# Patient Record
Sex: FEMALE | Race: BLACK OR AFRICAN AMERICAN | NOT HISPANIC OR LATINO | Employment: FULL TIME | URBAN - METROPOLITAN AREA
[De-identification: names, ages, dates, MRNs, and addresses within clinical notes are randomized per-mention and may not be internally consistent; named-entity substitution may affect disease eponyms.]

---

## 2017-01-17 ENCOUNTER — OFFICE VISIT (OUTPATIENT)
Dept: FAMILY MEDICINE | Facility: CLINIC | Age: 32
End: 2017-01-17
Payer: COMMERCIAL

## 2017-01-17 VITALS
WEIGHT: 153.2 LBS | SYSTOLIC BLOOD PRESSURE: 126 MMHG | OXYGEN SATURATION: 100 % | BODY MASS INDEX: 30.08 KG/M2 | RESPIRATION RATE: 16 BRPM | DIASTOLIC BLOOD PRESSURE: 72 MMHG | TEMPERATURE: 98.7 F | HEART RATE: 86 BPM | HEIGHT: 60 IN

## 2017-01-17 DIAGNOSIS — B37.31 YEAST INFECTION OF THE VAGINA: Primary | ICD-10-CM

## 2017-01-17 DIAGNOSIS — N76.0 BACTERIAL VAGINAL INFECTION: ICD-10-CM

## 2017-01-17 DIAGNOSIS — N76.0 VAGINITIS AND VULVOVAGINITIS: ICD-10-CM

## 2017-01-17 DIAGNOSIS — B96.89 BACTERIAL VAGINAL INFECTION: ICD-10-CM

## 2017-01-17 LAB
MICRO REPORT STATUS: ABNORMAL
SPECIMEN SOURCE: ABNORMAL
WET PREP SPEC: ABNORMAL

## 2017-01-17 PROCEDURE — 87210 SMEAR WET MOUNT SALINE/INK: CPT | Performed by: FAMILY MEDICINE

## 2017-01-17 PROCEDURE — 99213 OFFICE O/P EST LOW 20 MIN: CPT | Performed by: FAMILY MEDICINE

## 2017-01-17 RX ORDER — METRONIDAZOLE 500 MG/1
500 TABLET ORAL 2 TIMES DAILY
Qty: 28 TABLET | Refills: 0 | Status: SHIPPED | OUTPATIENT
Start: 2017-01-17 | End: 2018-04-04

## 2017-01-17 RX ORDER — METRONIDAZOLE 500 MG/1
500 TABLET ORAL 2 TIMES DAILY
Qty: 28 TABLET | Refills: 0 | Status: SHIPPED | OUTPATIENT
Start: 2017-01-17 | End: 2017-01-17

## 2017-01-17 NOTE — NURSING NOTE
Chief Complaint   Patient presents with     Vaginal Problem       Initial /72 mmHg  Pulse 86  Temp(Src) 98.7  F (37.1  C) (Tympanic)  Resp 16  Ht 5' (1.524 m)  Wt 153 lb 3.2 oz (69.491 kg)  BMI 29.92 kg/m2  SpO2 100% Estimated body mass index is 29.92 kg/(m^2) as calculated from the following:    Height as of this encounter: 5' (1.524 m).    Weight as of this encounter: 153 lb 3.2 oz (69.491 kg).  BP completed using cuff size: regular  Eneida Moran CMA

## 2017-01-17 NOTE — PROGRESS NOTES
Quick Note:    Please send normal lab letter when labs are complete  RESULTS TO PATIENT   METRONID 500MG TWICE DAILY   SIDE EFFECTS BENEFITS AND RISKS DISCUSSED   TREATMENT PROGNOSIS BENEFITS AND RISKS DISCUSSED   MEDICATION RISKS SIDE EFFECTS BENEFITS AND RISKS DISCUSSED     ______

## 2017-01-17 NOTE — Clinical Note
[facility]  [address]  [address]  [facility] [address]  [phone]                                                                                                           Abiola Silva  [address]   [facility] [address]    January 18, 2017      Dear Abiola,    The results of your recent tests were reviewed and are enclosed.     RESULTS TO PATIENT   METRONID 500MG TWICE DAILY   SIDE EFFECTS BENEFITS AND RISKS DISCUSSED    TREATMENT PROGNOSIS BENEFITS AND RISKS DISCUSSED   MEDICATION RISKS SIDE EFFECTS BENEFITS AND RISKS DISCUSSED   Results for orders placed or performed in visit on 01/17/17   Wet prep   Result Value Ref Range    Specimen Description Vagina     Wet Prep (A)      No Trichomonas seen  Clue cells seen  No yeast seen      Micro Report Status FINAL 01/17/2017            Thank you for choosing [facility].  We appreciate the opportunity to serve you and look forward to supporting your healthcare needs in the future.    If you have any questions or concerns, please call me or my staff at [phone].      Sincerely,    Juan Padron Jr MD

## 2017-01-17 NOTE — PATIENT INSTRUCTIONS
(B37.3) Yeast infection of the vagina  (primary encounter diagnosis)  Comment:    Plan: Wet prep, Chlamydia trachomatis PCR, Neisseria         gonorrhoeae PCR             (N76.0,  B96.89) Bacterial vaginal infection  Comment:    Plan: Chlamydia trachomatis PCR, Neisseria         gonorrhoeae PCR             (N76.0) Vaginitis and vulvovaginitis  Comment:    Plan: metroNIDAZOLE (FLAGYL) 500 MG tablet, Chlamydia        trachomatis PCR, Neisseria gonorrhoeae PCR,         DISCONTINUED: metroNIDAZOLE (FLAGYL) 500 MG         tablet

## 2017-01-17 NOTE — MR AVS SNAPSHOT
After Visit Summary   1/17/2017    Abiola Silva    MRN: [id number]           Patient Information     Date Of Birth          1985        Visit Information        Provider Department      1/17/2017 2:15 PM Juan Padron MD [facility]        Today's Diagnoses     Yeast infection of the vagina    -  1     Bacterial vaginal infection         Vaginitis and vulvovaginitis           Care Instructions    (B37.3) Yeast infection of the vagina  (primary encounter diagnosis)  Comment:    Plan: Wet prep, Chlamydia trachomatis PCR, Neisseria         gonorrhoeae PCR             (N76.0,  B96.89) Bacterial vaginal infection  Comment:    Plan: Chlamydia trachomatis PCR, Neisseria         gonorrhoeae PCR             (N76.0) Vaginitis and vulvovaginitis  Comment:    Plan: metroNIDAZOLE (FLAGYL) 500 MG tablet, Chlamydia        trachomatis PCR, Neisseria gonorrhoeae PCR,         DISCONTINUED: metroNIDAZOLE (FLAGYL) 500 MG         tablet                         Follow-ups after your visit        Future tests that were ordered for you today     Open Future Orders        Priority Expected Expires Ordered    Chlamydia trachomatis PCR Routine 1/18/2017 1/31/2017 1/17/2017    Neisseria gonorrhoeae PCR Routine 1/18/2017 1/31/2017 1/17/2017            Who to contact     If you have questions or need follow up information about today's clinic visit or your schedule please contact [facility] directly at [phone].  Normal or non-critical lab and imaging results will be communicated to you by MyChart, letter or phone within 4 business days after the clinic has received the results. If you do not hear from us within 7 days, please contact the clinic through MyChart or phone. If you have a critical or abnormal lab result, we will notify you by phone as soon as possible.  Submit refill requests through [employer] or call your pharmacy and they will  forward the refill request to us. Please allow 3 business days for your refill to be completed.          Additional Information About Your Visit        [employer] Information     [employer] gives you secure access to your electronic health record. If you see a primary care provider, you can also send messages to your care team and make appointments. If you have questions, please call your primary care clinic.  If you do not have a primary care provider, please call [phone] and they will assist you.        Care EveryWhere ID     This is your Care EveryWhere ID. This could be used by other organizations to access your [facility] medical records  [id number]        Your Vitals Were     Pulse Temperature Respirations Height BMI (Body Mass Index) Pulse Oximetry    86 98.7  F (37.1  C) (Tympanic) 16 5' (1.524 m) 29.92 kg/m2 100%       Blood Pressure from Last 3 Encounters:   01/17/17 126/72   10/27/16 136/87   10/04/16 123/78    Weight from Last 3 Encounters:   01/17/17 153 lb 3.2 oz (69.491 kg)   10/27/16 146 lb 3.2 oz (66.316 kg)   10/04/16 149 lb 14.4 oz (67.994 kg)              We Performed the Following     Wet prep          Today's Medication Changes          These changes are accurate as of: 1/17/17  3:35 PM.  If you have any questions, ask your nurse or doctor.               Start taking these medicines.        Dose/Directions    metroNIDAZOLE 500 MG tablet   Commonly known as:  FLAGYL   Used for:  Vaginitis and vulvovaginitis   Started by:  Juan Padron MD        Dose:  500 mg   Take 1 tablet (500 mg) by mouth 2 times daily   Quantity:  28 tablet   Refills:  0            Where to get your medicines      These medications were sent to [facility] [id number] [place] [address] AT [address] [place] [id number]     Phone:  [phone]    - metroNIDAZOLE 500 MG tablet             Primary Care Provider    Physician No Ref-Primary       No  address on file        Thank you!     Thank you for choosing [facility]  for your care. Our goal is always to provide you with excellent care. Hearing back from our patients is one way we can continue to improve our services. Please take a few minutes to complete the written survey that you may receive in the mail after your visit with us. Thank you!             Your Updated Medication List - Protect others around you: Learn how to safely use, store and throw away your medicines at www.disposemymeds.org.          This list is accurate as of: 1/17/17  3:35 PM.  Always use your most recent med list.                   Brand Name Dispense Instructions for use    metroNIDAZOLE 500 MG tablet    FLAGYL    28 tablet    Take 1 tablet (500 mg) by mouth 2 times daily

## 2018-04-04 ENCOUNTER — OFFICE VISIT (OUTPATIENT)
Dept: MIDWIFE SERVICES | Facility: CLINIC | Age: 33
End: 2018-04-04
Payer: COMMERCIAL

## 2018-04-04 VITALS
HEART RATE: 101 BPM | DIASTOLIC BLOOD PRESSURE: 80 MMHG | WEIGHT: 139 LBS | OXYGEN SATURATION: 99 % | BODY MASS INDEX: 27.29 KG/M2 | SYSTOLIC BLOOD PRESSURE: 118 MMHG | HEIGHT: 60 IN

## 2018-04-04 DIAGNOSIS — R30.0 DYSURIA: ICD-10-CM

## 2018-04-04 DIAGNOSIS — Z01.411 ENCOUNTER FOR GYNECOLOGICAL EXAMINATION WITH ABNORMAL FINDING: Primary | ICD-10-CM

## 2018-04-04 DIAGNOSIS — Z01.419 ENCOUNTER FOR GYNECOLOGICAL EXAMINATION WITHOUT ABNORMAL FINDING: ICD-10-CM

## 2018-04-04 DIAGNOSIS — Z32.00 PREGNANCY EXAMINATION OR TEST, PREGNANCY UNCONFIRMED: ICD-10-CM

## 2018-04-04 LAB
ALBUMIN UR-MCNC: NEGATIVE MG/DL
APPEARANCE UR: CLEAR
BETA HCG QUAL IFA URINE: NEGATIVE
BILIRUB UR QL STRIP: NEGATIVE
COLOR UR AUTO: YELLOW
ERYTHROCYTE [DISTWIDTH] IN BLOOD BY AUTOMATED COUNT: 18.2 % (ref 10–15)
GLUCOSE UR STRIP-MCNC: NEGATIVE MG/DL
HCT VFR BLD AUTO: 28.3 % (ref 35–47)
HGB BLD-MCNC: 8 G/DL (ref 11.7–15.7)
HGB UR QL STRIP: NEGATIVE
KETONES UR STRIP-MCNC: NEGATIVE MG/DL
LEUKOCYTE ESTERASE UR QL STRIP: NEGATIVE
MCH RBC QN AUTO: 20.6 PG (ref 26.5–33)
MCHC RBC AUTO-ENTMCNC: 28.3 G/DL (ref 31.5–36.5)
MCV RBC AUTO: 73 FL (ref 78–100)
NITRATE UR QL: NEGATIVE
PH UR STRIP: 8.5 PH (ref 5–7)
PLATELET # BLD AUTO: 564 10E9/L (ref 150–450)
RBC # BLD AUTO: 3.89 10E12/L (ref 3.8–5.2)
SOURCE: ABNORMAL
SP GR UR STRIP: 1.01 (ref 1–1.03)
UROBILINOGEN UR STRIP-ACNC: 0.2 EU/DL (ref 0.2–1)
WBC # BLD AUTO: 7.7 10E9/L (ref 4–11)

## 2018-04-04 PROCEDURE — 99213 OFFICE O/P EST LOW 20 MIN: CPT | Mod: 25 | Performed by: ADVANCED PRACTICE MIDWIFE

## 2018-04-04 PROCEDURE — 87591 N.GONORRHOEAE DNA AMP PROB: CPT | Performed by: ADVANCED PRACTICE MIDWIFE

## 2018-04-04 PROCEDURE — 99395 PREV VISIT EST AGE 18-39: CPT | Performed by: ADVANCED PRACTICE MIDWIFE

## 2018-04-04 PROCEDURE — 87340 HEPATITIS B SURFACE AG IA: CPT | Performed by: ADVANCED PRACTICE MIDWIFE

## 2018-04-04 PROCEDURE — 86780 TREPONEMA PALLIDUM: CPT | Performed by: ADVANCED PRACTICE MIDWIFE

## 2018-04-04 PROCEDURE — 86803 HEPATITIS C AB TEST: CPT | Performed by: ADVANCED PRACTICE MIDWIFE

## 2018-04-04 PROCEDURE — 87389 HIV-1 AG W/HIV-1&-2 AB AG IA: CPT | Performed by: ADVANCED PRACTICE MIDWIFE

## 2018-04-04 PROCEDURE — 81003 URINALYSIS AUTO W/O SCOPE: CPT | Performed by: ADVANCED PRACTICE MIDWIFE

## 2018-04-04 PROCEDURE — 36415 COLL VENOUS BLD VENIPUNCTURE: CPT | Performed by: ADVANCED PRACTICE MIDWIFE

## 2018-04-04 PROCEDURE — 85027 COMPLETE CBC AUTOMATED: CPT | Performed by: ADVANCED PRACTICE MIDWIFE

## 2018-04-04 PROCEDURE — 87491 CHLMYD TRACH DNA AMP PROBE: CPT | Performed by: ADVANCED PRACTICE MIDWIFE

## 2018-04-04 PROCEDURE — 84703 CHORIONIC GONADOTROPIN ASSAY: CPT | Performed by: ADVANCED PRACTICE MIDWIFE
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
## 2018-04-04 NOTE — MR AVS SNAPSHOT
After Visit Summary   4/4/2018    Abiola Silva    MRN: [id number]           Patient Information     Date Of Birth          1985        Visit Information        Provider Department      4/4/2018 2:00 PM Kirstin Morin APRN CNM [facility]        Today's Diagnoses     Encounter for gynecological examination with abnormal finding    -  1    Encounter for gynecological examination without abnormal finding        Pregnancy examination or test, pregnancy unconfirmed        Dysuria           Follow-ups after your visit        Your next 10 appointments already scheduled     Apr 10, 2018  7:00 AM CDT   US PELVIC COMPLETE W TRANSVAGINAL with RDUS1   [facility] ([facility])    [address] [place] [address] S  [address] [place] [id number] [phone]           Please bring a list of your medicines (including vitamins, minerals and over-the-counter drugs). Also, tell your doctor about any allergies you may have. Wear comfortable clothes and leave your valuables at home.  Adults: Drink six 8-ounce glasses of fluid one hour before your exam. Do NOT empty your bladder.  If you need to empty your bladder before your exam, try to release only a little bit of urine. Then, drink another 8oz glass of fluid.  Children: Children who are potty trained should drink at least 4 cups (32 oz) of liquid 45 minutes to one hour prior to the exam. The child s bladder must be full in order to achieve a diagnostic exam. If your child is very uncomfortable or has an urgent need to pee, please notify a technologist; they will try to find out how much longer the wait may be and provide instructions to help relieve the pressure. Occasionally it is medically necessary to insert a urinary catheter to fill the bladder.  Please call the Imaging Department at your exam site with any questions.              Future tests that were ordered for you today     Open Future Orders        Priority  Expected Expires Ordered    US Pelvic Complete w Transvaginal Routine  4/4/2019 4/4/2018            Who to contact     If you have questions or need follow up information about today's clinic visit or your schedule please contact [facility] directly at [phone].  Normal or non-critical lab and imaging results will be communicated to you by MyChart, letter or phone within 4 business days after the clinic has received the results. If you do not hear from us within 7 days, please contact the clinic through [employer] or phone. If you have a critical or abnormal lab result, we will notify you by phone as soon as possible.  Submit refill requests through [employer] or call your pharmacy and they will forward the refill request to us. Please allow 3 business days for your refill to be completed.          Additional Information About Your Visit        [employer] Information     [employer] gives you secure access to your electronic health record. If you see a primary care provider, you can also send messages to your care team and make appointments. If you have questions, please call your primary care clinic.  If you do not have a primary care provider, please call [phone] and they will assist you.        Care EveryWhere ID     This is your Care EveryWhere ID. This could be used by other organizations to access your [facility] medical records  [id number]        Your Vitals Were     Pulse Height Last Period Pulse Oximetry BMI (Body Mass Index)       101 5' (1.524 m) 03/22/2018 99% 27.15 kg/m2        Blood Pressure from Last 3 Encounters:   04/04/18 118/80   01/17/17 126/72   10/27/16 136/87    Weight from Last 3 Encounters:   04/04/18 139 lb (63 kg)   01/17/17 153 lb 3.2 oz (69.5 kg)   10/27/16 146 lb 3.2 oz (66.3 kg)              We Performed the Following     Anti Treponema     Beta HCG qual IFA urine     CBC with platelets     CHLAMYDIA TRACHOMATIS PCR     Hepatitis B surface antigen     Hepatitis C antibody      HIV Antigen Antibody Combo     NEISSERIA GONORRHOEA PCR     UA reflex to Microscopic and Culture        Primary Care Provider Fax #    Physician No Ref-Primary [phone]       No address on file        Equal Access to Services     CLEMENTE TROY : Ngoc Gutierrez, judehector villanuevarachha, mars kaascencion tonileslie, tata arlynin hayaamaricruz salazarmeliaclinton anderson. So [facility] [phone].    ATENCIÓN: Si habla español, tiene a zamudio disposición servicios gratuitos de asistencia lingüística. Llame al [phone].    We comply with applicable federal civil rights laws and [place] laws. We do not discriminate on the basis of race, color, national origin, age, disability, sex, sexual orientation, or gender identity.            Thank you!     Thank you for choosing [facility]  for your care. Our goal is always to provide you with excellent care. Hearing back from our patients is one way we can continue to improve our services. Please take a few minutes to complete the written survey that you may receive in the mail after your visit with us. Thank you!             Your Updated Medication List - Protect others around you: Learn how to safely use, store and throw away your medicines at www.disposemymeds.org.      Notice  As of 4/4/2018 11:59 PM    You have not been prescribed any medications.

## 2018-04-04 NOTE — NURSING NOTE
Chief Complaint   Patient presents with     Physical     phy and pap       Initial /80  Pulse 101  Ht 5' (1.524 m)  Wt 139 lb (63 kg)  LMP 2018  SpO2 99%  BMI 27.15 kg/m2 Estimated body mass index is 27.15 kg/(m^2) as calculated from the following:    Height as of this encounter: 5' (1.524 m).    Weight as of this encounter: 139 lb (63 kg).  BP completed using cuff size: regular        The following HM Due: NONE      The following patient reported/Care Every where data was sent to:  P ABSTRACT QUALITY INITIATIVES [[id number]]  na      patient has appointment for today

## 2018-04-05 LAB
C TRACH DNA SPEC QL NAA+PROBE: NEGATIVE
HBV SURFACE AG SERPL QL IA: NONREACTIVE
HCV AB SERPL QL IA: NONREACTIVE
HIV 1+2 AB+HIV1 P24 AG SERPL QL IA: NONREACTIVE
N GONORRHOEA DNA SPEC QL NAA+PROBE: NEGATIVE
SPECIMEN SOURCE: NORMAL
SPECIMEN SOURCE: NORMAL
T PALLIDUM IGG+IGM SER QL: NEGATIVE

## 2018-04-11 ENCOUNTER — TELEPHONE (OUTPATIENT)
Dept: MIDWIFE SERVICES | Facility: CLINIC | Age: 33
End: 2018-04-11

## 2018-04-11 DIAGNOSIS — D50.9 IRON DEFICIENCY ANEMIA, UNSPECIFIED IRON DEFICIENCY ANEMIA TYPE: Primary | ICD-10-CM

## 2018-04-11 DIAGNOSIS — D62 ANEMIA DUE TO BLOOD LOSS, ACUTE: ICD-10-CM

## 2018-04-11 RX ORDER — FERROUS SULFATE 325(65) MG
325 TABLET ORAL 2 TIMES DAILY
Qty: 60 TABLET | Refills: 2 | Status: SHIPPED | OUTPATIENT
Start: 2018-04-11

## 2018-04-11 NOTE — TELEPHONE ENCOUNTER
Can you please call and notify patient of normal lab results but low iron. I would like her to come in for some additional lab work and I put the orders in as future orders and also sent an rx for iron supplement to the [place] 2nd floor pharmacy. I will notify her once I receive the rest of the results. Thanks, Kirstin

## 2018-04-12 NOTE — TELEPHONE ENCOUNTER
Abiola called in today about her labs.    HPI:  Abiola had an annual exam with complaint of excessive menstruation.  Physical exam is significant for uterine fibroids with enlarged uterus to 14-16 wk size.     OBJECTIVE:  Nl STI screen results informed to patient.  CBC RESULTS:   Recent Labs   Lab Test  04/04/18   1520   WBC  7.7   RBC  3.89   HGB  8.0*   HCT  28.3*   MCV  73*   MCH  20.6*   MCHC  28.3*   RDW  18.2*   PLT  564*     Anemia suspected due to excessive menstruation but need to rule out other dx.      Future labs ordered and pt to schedule US to confirm location of fibroids and if mgt by Mirena is possible.      ASSESSMENT: Anemia, Moderate.   Fibroids,  Excessive menstration.    PLAN: Pt to have labs drawn as ordered future. Schedule an US for fibroid mgt with Mirena.  If Mirena fails to modify bleeding pattern consult with OB/GYN about alternative options including surgical.

## 2019-04-19 ENCOUNTER — HEALTH MAINTENANCE LETTER (OUTPATIENT)
Age: 34
End: 2019-04-19

## 2019-10-03 ENCOUNTER — HEALTH MAINTENANCE LETTER (OUTPATIENT)
Age: 34
End: 2019-10-03

## 2020-11-07 ENCOUNTER — HEALTH MAINTENANCE LETTER (OUTPATIENT)
Age: 35
End: 2020-11-07

## 2021-01-18 ENCOUNTER — OFFICE VISIT (OUTPATIENT)
Dept: URGENT CARE | Facility: URGENT CARE | Age: 36
End: 2021-01-18
Payer: COMMERCIAL

## 2021-01-18 VITALS
SYSTOLIC BLOOD PRESSURE: 126 MMHG | TEMPERATURE: 99.5 F | WEIGHT: 143.4 LBS | BODY MASS INDEX: 28.01 KG/M2 | OXYGEN SATURATION: 100 % | HEART RATE: 80 BPM | DIASTOLIC BLOOD PRESSURE: 84 MMHG

## 2021-01-18 DIAGNOSIS — B35.4 TINEA CORPORIS: Primary | ICD-10-CM

## 2021-01-18 DIAGNOSIS — L70.0 ACNE VULGARIS: ICD-10-CM

## 2021-01-18 PROCEDURE — 99203 OFFICE O/P NEW LOW 30 MIN: CPT | Performed by: FAMILY MEDICINE

## 2021-01-18 RX ORDER — DOXYCYCLINE HYCLATE 100 MG
100 TABLET ORAL 2 TIMES DAILY
Qty: 20 TABLET | Refills: 0 | Status: SHIPPED | OUTPATIENT
Start: 2021-01-18

## 2021-01-18 RX ORDER — MICONAZOLE NITRATE 20 MG/G
CREAM TOPICAL 2 TIMES DAILY
Qty: 15 G | Refills: 1 | Status: SHIPPED | OUTPATIENT
Start: 2021-01-18 | End: 2021-02-01

## 2021-01-18 ASSESSMENT — ENCOUNTER SYMPTOMS
FATIGUE: 0
BLOOD IN STOOL: 0
ADENOPATHY: 0
EYE PAIN: 0
DIARRHEA: 0
COUGH: 0
NUMBNESS: 0
VOMITING: 0
DYSURIA: 0
CHILLS: 0
SHORTNESS OF BREATH: 0
FEVER: 0
EYE ITCHING: 0
HEADACHES: 0
PSYCHIATRIC NEGATIVE: 1
DIZZINESS: 0
SORE THROAT: 0
ABDOMINAL PAIN: 0
NAUSEA: 0
SINUS PRESSURE: 0
EYE REDNESS: 0
CONSTIPATION: 0
PHOTOPHOBIA: 0
BRUISES/BLEEDS EASILY: 0
SINUS PAIN: 0
RHINORRHEA: 0
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
## 2021-05-05 ENCOUNTER — OFFICE VISIT (OUTPATIENT)
Dept: URGENT CARE | Facility: URGENT CARE | Age: 36
End: 2021-05-05
Payer: COMMERCIAL

## 2021-05-05 VITALS
BODY MASS INDEX: 25.97 KG/M2 | HEART RATE: 86 BPM | DIASTOLIC BLOOD PRESSURE: 89 MMHG | RESPIRATION RATE: 20 BRPM | TEMPERATURE: 97.7 F | WEIGHT: 133 LBS | OXYGEN SATURATION: 100 % | SYSTOLIC BLOOD PRESSURE: 138 MMHG

## 2021-05-05 DIAGNOSIS — Z86.018 HISTORY OF UTERINE FIBROID: ICD-10-CM

## 2021-05-05 DIAGNOSIS — N92.6 ABNORMAL MENSTRUAL CYCLE: ICD-10-CM

## 2021-05-05 DIAGNOSIS — I88.9 CERVICAL LYMPHADENITIS: Primary | ICD-10-CM

## 2021-05-05 DIAGNOSIS — M54.9 UPPER BACK PAIN ON LEFT SIDE: ICD-10-CM

## 2021-05-05 PROCEDURE — 99214 OFFICE O/P EST MOD 30 MIN: CPT | Performed by: PHYSICIAN ASSISTANT

## 2021-05-05 RX ORDER — IBUPROFEN 600 MG/1
600 TABLET, FILM COATED ORAL EVERY 6 HOURS PRN
Qty: 30 TABLET | Refills: 0 | Status: SHIPPED | OUTPATIENT
Start: 2021-05-05

## 2021-05-05 ASSESSMENT — ENCOUNTER SYMPTOMS
SINUS PAIN: 0
CONSTITUTIONAL NEGATIVE: 1
FEVER: 0
COUGH: 0
CHILLS: 0
COLOR CHANGE: 0
JOINT SWELLING: 0
GASTROINTESTINAL NEGATIVE: 1
RHINORRHEA: 0
WHEEZING: 0
SINUS PRESSURE: 0
BACK PAIN: 1
MYALGIAS: 1
ADENOPATHY: 1
CARDIOVASCULAR NEGATIVE: 1
FATIGUE: 0
ARTHRALGIAS: 0
SHORTNESS OF BREATH: 0
RESPIRATORY NEGATIVE: 1
SORE THROAT: 0
PALPITATIONS: 0
BRUISES/BLEEDS EASILY: 0

## 2021-05-05 NOTE — PROGRESS NOTES
Lisa Culver is a 36 year old who presents for the following health issues   HPI   Concern - swollen glands  Onset: 3days  Description: Noticed tender, swollen lymph node over the R side of her neck.    Intensity: moderate  Progression of Symptoms:  improving  Accompanying Signs & Symptoms:  No URI symptoms, sore throat or fevers.  No redness or drainage.    Previous history of similar problem: Yes, had same issue 4months ago and was given antibiotics with good resolution  Precipitating factors:        Worsened by: none  Alleviating factors:        Improved by: none  Therapies tried and outcome: tylenol, rest, fluids with some relief    2)  Also complains of irregular menstrual cycle this month with 2 periods  by 2weeks.  No prior hx of irregular menses.  Not currently bleeding.  Reports hx of fibroids with clots during her periods.  Feels that her fibroid has increased in size.    3)  Also complains of upper back pain, L side for the past few days.  Describes tingling sensation at times but no radicular pain, numbness or weakness.  No bladder or bowel dysfunction.  No trauma or injuries.  It is worse with deep breaths.  Has tried tylenol with minimal relief.    Patient Active Problem List   Diagnosis     Left breast lump     Ovarian cyst     Excessive or frequent menstruation     Current Outpatient Medications   Medication     doxycycline hyclate (VIBRA-TABS) 100 MG tablet     ferrous sulfate (IRON) 325 (65 Fe) MG tablet     No current facility-administered medications for this visit.         Allergies   Allergen Reactions     Cephalexin      Migraine     Latex      Prednisone      Made bones hurt       Review of Systems   Constitutional: Negative.  Negative for chills, fatigue and fever.   HENT: Negative for congestion, ear discharge, ear pain, hearing loss, rhinorrhea, sinus pressure, sinus pain and sore throat.    Respiratory: Negative.  Negative for cough, shortness of breath and wheezing.     Cardiovascular: Negative.  Negative for chest pain, palpitations and peripheral edema.   Gastrointestinal: Negative.    Genitourinary: Positive for menstrual problem. Negative for vaginal bleeding and vaginal discharge.   Musculoskeletal: Positive for back pain and myalgias. Negative for arthralgias, gait problem and joint swelling.   Skin: Negative for color change.   Hematological: Positive for adenopathy. Does not bruise/bleed easily.   All other systems reviewed and are negative.           Objective    /89 (BP Location: Left arm, Patient Position: Sitting, Cuff Size: Adult Regular)   Pulse 86   Temp 97.7  F (36.5  C) (Tympanic)   Resp 20   Wt 60.3 kg (133 lb)   SpO2 100%   BMI 25.97 kg/m    Body mass index is 25.97 kg/m .  Physical Exam  Vitals signs and nursing note reviewed.   Constitutional:       General: She is not in acute distress.     Appearance: Normal appearance. She is well-developed and normal weight. She is not ill-appearing.   HENT:      Head: Normocephalic and atraumatic.      Comments: TMs are intact without any erythema or bulging bilaterally.  Airway is patent.     Nose: Nose normal.      Mouth/Throat:      Lips: Pink.      Mouth: Mucous membranes are moist.      Pharynx: Oropharynx is clear. Uvula midline. No pharyngeal swelling, oropharyngeal exudate or posterior oropharyngeal erythema.      Tonsils: No tonsillar exudate.   Eyes:      General: No scleral icterus.     Extraocular Movements: Extraocular movements intact.      Conjunctiva/sclera: Conjunctivae normal.      Pupils: Pupils are equal, round, and reactive to light.   Neck:      Musculoskeletal: Normal range of motion and neck supple.      Thyroid: No thyromegaly.   Cardiovascular:      Rate and Rhythm: Normal rate and regular rhythm.      Pulses: Normal pulses.      Heart sounds: Normal heart sounds, S1 normal and S2 normal. No murmur. No friction rub. No gallop.    Pulmonary:      Effort: Pulmonary effort is normal. No  accessory muscle usage, respiratory distress or retractions.      Breath sounds: Normal breath sounds and air entry. No stridor. No decreased breath sounds, wheezing, rhonchi or rales.   Abdominal:      General: Abdomen is flat. Bowel sounds are normal.      Palpations: Abdomen is soft. There is mass. There is no hepatomegaly or splenomegaly.      Tenderness: There is no abdominal tenderness. There is no right CVA tenderness, left CVA tenderness, guarding or rebound. Negative signs include Mauricio's sign, Rovsing's sign and McBurney's sign.      Hernia: No hernia is present.   Musculoskeletal:      Thoracic back: She exhibits tenderness and spasm (L paraspinous muscle). She exhibits normal range of motion, no bony tenderness, no swelling, no edema, no deformity, no laceration and normal pulse.   Lymphadenopathy:      Head:      Right side of head: No submental, submandibular or tonsillar adenopathy.      Left side of head: No submental, submandibular or tonsillar adenopathy.      Cervical: Cervical adenopathy present.      Right cervical: Superficial cervical adenopathy present.      Left cervical: No superficial cervical adenopathy.   Skin:     General: Skin is warm and dry.      Capillary Refill: Capillary refill takes less than 2 seconds.      Nails: There is no clubbing.     Neurological:      Mental Status: She is alert and oriented to person, place, and time.   Psychiatric:         Mood and Affect: Mood normal.         Behavior: Behavior normal.         Thought Content: Thought content normal.         Judgment: Judgment normal.          Assessment/Plan:  Cervical lymphadenitis:  No other associated symptoms.  Will treat with [id number] for lymphadenitis and take with food/probiotics to minimize GI upset.  Recommend tylenol/ibuprofen prn pain/fever.  Recheck in clinic if symptoms worsen or if symptoms do not improve.  Will also send to ENT for further evaluation and management.   -      amoxicillin-clavulanate (AUGMENTIN) 875-125 MG tablet; Take 1 tablet by mouth 2 times daily for 10 days  -     OTOLARYNGOLOGY REFERRAL    Abnormal menstrual cycle:  Bleeding has since stopped.  Hx of fibroids as well.  Will send to OBGYN.  -     OB/GYN REFERRAL    History of uterine fibroid  -     OB/GYN REFERRAL    Upper back pain on left side:  Most likely strain/sprain/spasm.  Recommend RICE and will give ibuprofen prn pain. Recheck in clinic if symptoms worsen or if symptoms do not improve.   -     ibuprofen (ADVIL/MOTRIN) 600 MG tablet; Take 1 tablet (600 mg) by mouth every 6 hours as needed for moderate pain        Kaitlin See AIDEE Lopez

## 2021-06-15 ENCOUNTER — OFFICE VISIT (OUTPATIENT)
Dept: URGENT CARE | Facility: URGENT CARE | Age: 36
End: 2021-06-15
Payer: COMMERCIAL

## 2021-06-15 VITALS
SYSTOLIC BLOOD PRESSURE: 147 MMHG | TEMPERATURE: 98.9 F | WEIGHT: 134 LBS | HEART RATE: 74 BPM | OXYGEN SATURATION: 100 % | BODY MASS INDEX: 26.17 KG/M2 | DIASTOLIC BLOOD PRESSURE: 81 MMHG

## 2021-06-15 DIAGNOSIS — N89.8 VAGINAL DISCHARGE: Primary | ICD-10-CM

## 2021-06-15 DIAGNOSIS — B37.31 YEAST INFECTION OF THE VAGINA: ICD-10-CM

## 2021-06-15 DIAGNOSIS — N76.0 BACTERIAL VAGINITIS: ICD-10-CM

## 2021-06-15 DIAGNOSIS — B96.89 BACTERIAL VAGINITIS: ICD-10-CM

## 2021-06-15 LAB
ALBUMIN UR-MCNC: NEGATIVE MG/DL
APPEARANCE UR: CLEAR
BACTERIA #/AREA URNS HPF: ABNORMAL /HPF
BILIRUB UR QL STRIP: NEGATIVE
COLOR UR AUTO: YELLOW
GLUCOSE UR STRIP-MCNC: NEGATIVE MG/DL
HGB UR QL STRIP: NEGATIVE
KETONES UR STRIP-MCNC: NEGATIVE MG/DL
LEUKOCYTE ESTERASE UR QL STRIP: ABNORMAL
NITRATE UR QL: NEGATIVE
NON-SQ EPI CELLS #/AREA URNS LPF: ABNORMAL /LPF
PH UR STRIP: 6 PH (ref 5–7)
RBC #/AREA URNS AUTO: ABNORMAL /HPF
SOURCE: ABNORMAL
SP GR UR STRIP: 1.02 (ref 1–1.03)
SPECIMEN SOURCE: ABNORMAL
UROBILINOGEN UR STRIP-ACNC: 0.2 EU/DL (ref 0.2–1)
WBC #/AREA URNS AUTO: ABNORMAL /HPF
WET PREP SPEC: ABNORMAL

## 2021-06-15 PROCEDURE — 81001 URINALYSIS AUTO W/SCOPE: CPT | Performed by: NURSE PRACTITIONER

## 2021-06-15 PROCEDURE — 99214 OFFICE O/P EST MOD 30 MIN: CPT | Performed by: NURSE PRACTITIONER

## 2021-06-15 PROCEDURE — 87210 SMEAR WET MOUNT SALINE/INK: CPT | Performed by: NURSE PRACTITIONER

## 2021-06-15 RX ORDER — METRONIDAZOLE 500 MG/1
500 TABLET ORAL 2 TIMES DAILY
Qty: 14 TABLET | Refills: 0 | Status: SHIPPED | OUTPATIENT
Start: 2021-06-15 | End: 2021-06-22

## 2021-06-15 RX ORDER — FLUCONAZOLE 150 MG/1
150 TABLET ORAL ONCE
Qty: 2 TABLET | Refills: 0 | Status: SHIPPED | OUTPATIENT
Start: 2021-06-15 | End: 2021-06-15

## 2021-06-15 ASSESSMENT — ENCOUNTER SYMPTOMS: DYSURIA: 1
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
## 2021-09-05 ENCOUNTER — HEALTH MAINTENANCE LETTER (OUTPATIENT)
Age: 36
End: 2021-09-05

## 2021-12-26 ENCOUNTER — HEALTH MAINTENANCE LETTER (OUTPATIENT)
Age: 36
End: 2021-12-26

## 2022-09-01 ENCOUNTER — OFFICE VISIT (OUTPATIENT)
Dept: URGENT CARE | Facility: URGENT CARE | Age: 37
End: 2022-09-01
Payer: COMMERCIAL

## 2022-09-01 VITALS
DIASTOLIC BLOOD PRESSURE: 76 MMHG | OXYGEN SATURATION: 99 % | SYSTOLIC BLOOD PRESSURE: 116 MMHG | WEIGHT: 132.2 LBS | BODY MASS INDEX: 25.82 KG/M2 | TEMPERATURE: 97.7 F | HEART RATE: 86 BPM

## 2022-09-01 DIAGNOSIS — S09.90XA INJURY OF HEAD, INITIAL ENCOUNTER: Primary | ICD-10-CM

## 2022-09-01 PROCEDURE — 99214 OFFICE O/P EST MOD 30 MIN: CPT | Performed by: PHYSICIAN ASSISTANT

## 2022-09-01 ASSESSMENT — ENCOUNTER SYMPTOMS
HEADACHES: 1
COUGH: 0
WEAKNESS: 0
ENDOCRINE NEGATIVE: 1
DIZZINESS: 1
EYES NEGATIVE: 1
BACK PAIN: 0
NAUSEA: 0
SHORTNESS OF BREATH: 0
ALLERGIC/IMMUNOLOGIC NEGATIVE: 1
CARDIOVASCULAR NEGATIVE: 1
NECK PAIN: 0
MYALGIAS: 0
DIARRHEA: 0
VOMITING: 0
PALPITATIONS: 0
MUSCULOSKELETAL NEGATIVE: 1
ARTHRALGIAS: 0
NECK STIFFNESS: 0
RESPIRATORY NEGATIVE: 1
JOINT SWELLING: 0
RHINORRHEA: 0
LIGHT-HEADEDNESS: 0
FEVER: 0
CHILLS: 0
SORE THROAT: 0
WOUND: 0

## 2022-09-02 NOTE — PROGRESS NOTES
Chief Complaint:    Chief Complaint   Patient presents with     Urgent Care     Head Injury     Fell and hit head the back of head and left side of face about 4 or 5 o'clock today      Medical Decision Making:    Vital signs reviewed by Forrest Huynh PA-C  /76 (BP Location: Left arm, Patient Position: Sitting, Cuff Size: Adult Regular)   Pulse 86   Temp 97.7  F (36.5  C) (Tympanic)   Wt 60 kg (132 lb 3.2 oz)   SpO2 99%   BMI 25.82 kg/m      Differential Diagnosis:  Head InjuryMild head injury, Concussion, Skull fracture, Intracranial hemorrhage    Anemia      ASSESSMENT:     1. Injury of head, initial encounter       PLAN:     Patient is very upset in clinic and is crying.  Neuro exam was benign.  With syncopal event, and worsening headache, patient instructed to go to the ED now for further evaluation, labs, and imaging.  Patient declined EMS transport.  Patient instructed to follow up with PCP in the next week..  Patient verbalized understanding and agreed with this plan.    Labs:     No results found for any visits on 09/01/22.    Current Meds:    Current Outpatient Medications:      ferrous sulfate (IRON) 325 (65 Fe) MG tablet, Take 1 tablet (325 mg) by mouth 2 times daily, Disp: 60 tablet, Rfl: 2     Naproxen Sodium (ALEVE PO), , Disp: , Rfl:      doxycycline hyclate (VIBRA-TABS) 100 MG tablet, Take 1 tablet (100 mg) by mouth 2 times daily (Patient not taking: No sig reported), Disp: 20 tablet, Rfl: 0     HAYDEE PO, , Disp: , Rfl:      ibuprofen (ADVIL/MOTRIN) 600 MG tablet, Take 1 tablet (600 mg) by mouth every 6 hours as needed for moderate pain (Patient not taking: Reported on 9/1/2022), Disp: 30 tablet, Rfl: 0     NONFORMULARY, Black strap molasses (Patient not taking: Reported on 9/1/2022), Disp: , Rfl:     Allergies:  Allergies   Allergen Reactions     Cephalexin      Migraine     Latex      Prednisone      Made bones hurt       SUBJECTIVE    HPI: Abiola Silva is an 37 year old female  who presents for evaluation and treatment of head injury.  Patient was having an asthma attack and had a syncopal event when she fell and hit the back of her head on something in her closet 2 hours ago.  She does not know how long she was unconscious for.  She is currently undergoing iron transfusions for anemia.  She is complaining of headache, and pressure in her head that is worsening, some dizziness, and problems hearing out of her L ear.      No Hx of head injury or concussion.    ROS:      Review of Systems   Constitutional: Negative for chills and fever.   HENT: Positive for hearing loss. Negative for congestion, ear pain, rhinorrhea and sore throat.    Eyes: Negative.    Respiratory: Negative.  Negative for cough and shortness of breath.    Cardiovascular: Negative.  Negative for chest pain and palpitations.   Gastrointestinal: Negative for diarrhea, nausea and vomiting.   Endocrine: Negative.    Genitourinary: Negative.    Musculoskeletal: Negative.  Negative for arthralgias, back pain, joint swelling, myalgias, neck pain and neck stiffness.   Skin: Negative.  Negative for rash and wound.   Allergic/Immunologic: Negative.  Negative for immunocompromised state.   Neurological: Positive for dizziness and headaches. Negative for weakness and light-headedness.        Family History   Family History   Problem Relation Age of Onset     Breast Cancer Mother      Cancer Father        Social History  Social History     Socioeconomic History     Marital status: Single     Spouse name: Not on file     Number of children: Not on file     Years of education: Not on file     Highest education level: Not on file   Occupational History     Occupation: student   Tobacco Use     Smoking status: Never Smoker     Smokeless tobacco: Never Used   Substance and Sexual Activity     Alcohol use: Yes     Comment: socially     Drug use: No     Sexual activity: Yes     Partners: Male     Birth control/protection: None, Pull-out method    Other Topics Concern     Parent/sibling w/ CABG, MI or angioplasty before 65F 55M? Not Asked   Social History Narrative    How much exercise per week? 3 times weekly    How much calcium per day? Very Little      How much caffeine per day? Soda  2 daily    How much vitamin D per day? Very Little    Do you/your family wear seatbelts?  Yes    Do you/your family use safety helmets? Yes    Do you/your family use sunscreen? No    Do you/your family keep firearms in the home? No    Do you/your family have a smoke detector(s)? Yes        Do you feel safe in your home? Yes    Has anyone ever touched you in an unwanted manner? No                  Social Determinants of Health     Financial Resource Strain: Not on file   Food Insecurity: Not on file   Transportation Needs: Not on file   Physical Activity: Not on file   Stress: Not on file   Social Connections: Not on file   Intimate Partner Violence: Not on file   Housing Stability: Not on file        Surgical History:  Past Surgical History:   Procedure Laterality Date      SECTION          Problem List:  Patient Active Problem List   Diagnosis     Left breast lump     Ovarian cyst     Excessive or frequent menstruation           OBJECTIVE:     Vital signs noted and reviewed by Forrest Huynh PA-C  /76 (BP Location: Left arm, Patient Position: Sitting, Cuff Size: Adult Regular)   Pulse 86   Temp 97.7  F (36.5  C) (Tympanic)   Wt 60 kg (132 lb 3.2 oz)   SpO2 99%   BMI 25.82 kg/m       PEFR:    Physical Exam  Vitals and nursing note reviewed.   Constitutional:       General: She is not in acute distress.     Appearance: She is well-developed. She is not ill-appearing, toxic-appearing or diaphoretic.   HENT:      Head: Normocephalic and atraumatic.      Right Ear: Tympanic membrane and external ear normal. No drainage, swelling or tenderness. Tympanic membrane is not perforated, erythematous, retracted or bulging.      Left Ear: Tympanic membrane and  external ear normal. No drainage, swelling or tenderness. Tympanic membrane is not perforated, erythematous, retracted or bulging.      Nose: No mucosal edema, congestion or rhinorrhea.      Right Sinus: No maxillary sinus tenderness or frontal sinus tenderness.      Left Sinus: No maxillary sinus tenderness or frontal sinus tenderness.      Mouth/Throat:      Pharynx: No pharyngeal swelling, oropharyngeal exudate, posterior oropharyngeal erythema or uvula swelling.      Tonsils: No tonsillar abscesses.   Eyes:      General: No visual field deficit.     Pupils: Pupils are equal, round, and reactive to light.   Neck:      Trachea: Trachea normal.   Cardiovascular:      Rate and Rhythm: Normal rate and regular rhythm.      Heart sounds: Normal heart sounds, S1 normal and S2 normal. No murmur heard.    No friction rub. No gallop.   Pulmonary:      Effort: Pulmonary effort is normal. No respiratory distress.      Breath sounds: Normal breath sounds. No decreased breath sounds, wheezing, rhonchi or rales.   Abdominal:      General: Bowel sounds are normal. There is no distension.      Palpations: Abdomen is soft. Abdomen is not rigid. There is no mass.      Tenderness: There is no abdominal tenderness. There is no guarding or rebound.   Musculoskeletal:      Cervical back: Full passive range of motion without pain, normal range of motion and neck supple.   Lymphadenopathy:      Cervical: No cervical adenopathy.   Skin:     General: Skin is warm and dry.   Neurological:      Mental Status: She is alert and oriented to person, place, and time.      GCS: GCS eye subscore is 4. GCS verbal subscore is 5. GCS motor subscore is 6.      Cranial Nerves: No cranial nerve deficit or facial asymmetry.      Sensory: Sensation is intact. No sensory deficit.      Motor: Motor function is intact. No weakness, atrophy or abnormal muscle tone.      Coordination: Coordination is intact. Romberg sign negative. Coordination normal. Rapid  alternating movements normal.      Gait: Gait is intact. Gait normal.      Deep Tendon Reflexes: Reflexes are normal and symmetric.   Psychiatric:         Behavior: Behavior normal. Behavior is cooperative.         Thought Content: Thought content normal.         Judgment: Judgment normal.             Forrest Huynh PA-C  9/1/2022, 7:01 PM

## 2022-10-23 ENCOUNTER — HEALTH MAINTENANCE LETTER (OUTPATIENT)
Age: 37
End: 2022-10-23

## 2023-06-15 ENCOUNTER — OFFICE VISIT (OUTPATIENT)
Dept: URGENT CARE | Facility: URGENT CARE | Age: 38
End: 2023-06-15
Payer: COMMERCIAL

## 2023-06-15 VITALS
WEIGHT: 128 LBS | BODY MASS INDEX: 25 KG/M2 | RESPIRATION RATE: 17 BRPM | OXYGEN SATURATION: 97 % | DIASTOLIC BLOOD PRESSURE: 83 MMHG | TEMPERATURE: 97.4 F | HEART RATE: 78 BPM | SYSTOLIC BLOOD PRESSURE: 120 MMHG

## 2023-06-15 DIAGNOSIS — B37.31 YEAST INFECTION OF THE VAGINA: Primary | ICD-10-CM

## 2023-06-15 DIAGNOSIS — N89.8 VAGINAL DISCHARGE: ICD-10-CM

## 2023-06-15 LAB
CLUE CELLS: ABNORMAL
TRICHOMONAS, WET PREP: ABNORMAL
WBC'S/HIGH POWER FIELD, WET PREP: ABNORMAL
YEAST, WET PREP: PRESENT

## 2023-06-15 PROCEDURE — 99213 OFFICE O/P EST LOW 20 MIN: CPT | Performed by: PHYSICIAN ASSISTANT

## 2023-06-15 PROCEDURE — 87210 SMEAR WET MOUNT SALINE/INK: CPT | Performed by: PHYSICIAN ASSISTANT

## 2023-06-15 RX ORDER — FLUCONAZOLE 150 MG/1
150 TABLET ORAL
Qty: 3 TABLET | Refills: 0 | Status: SHIPPED | OUTPATIENT
Start: 2023-06-15 | End: 2023-06-22

## 2023-06-15 ASSESSMENT — ENCOUNTER SYMPTOMS
DYSURIA: 0
WEAKNESS: 0
MUSCULOSKELETAL NEGATIVE: 1
FLANK PAIN: 0
SORE THROAT: 0
FREQUENCY: 0
DIARRHEA: 0
LIGHT-HEADEDNESS: 0
CHILLS: 0
CARDIOVASCULAR NEGATIVE: 1
RHINORRHEA: 0
NAUSEA: 0
CONSTITUTIONAL NEGATIVE: 1
DIZZINESS: 0
FATIGUE: 0
FEVER: 0
COUGH: 0
RESPIRATORY NEGATIVE: 1
NECK STIFFNESS: 0
PALPITATIONS: 0
SHORTNESS OF BREATH: 0
ENDOCRINE NEGATIVE: 1
HEMATURIA: 0
MYALGIAS: 0
GASTROINTESTINAL NEGATIVE: 1
NEUROLOGICAL NEGATIVE: 1
VOMITING: 0
ADENOPATHY: 0
ABDOMINAL PAIN: 0
POLYDIPSIA: 0
NECK PAIN: 0
ACTIVITY CHANGE: 0
HEADACHES: 0

## 2023-06-15 NOTE — PROGRESS NOTES
Chief Complaint:    Chief Complaint   Patient presents with     Vaginal Problem     Itching, cottage cheese like d/c most [facility] yeast inf          ASSESSMENT     1. Yeast infection of the vagina    2. Vaginal discharge         PLAN    Wet prep was positive for yeast  Rx for Diflucan today  Follow up with PCP in 1 week if symptoms are not improving.  Worrisome symptoms discussed with instructions to go to the ED.  Patient verbalized understanding and agreed with this plan.    Labs:     Results for orders placed or performed in visit on 06/15/23   Wet prep - lab collect     Status: Abnormal    Specimen: Vagina; Swab   Result Value Ref Range    Trichomonas Absent Absent    Yeast Present (A) Absent    Clue Cells Absent Absent    WBCs/high power field 2+ (A) None       Problem history    Patient Active Problem List   Diagnosis     Left breast lump     Ovarian cyst     Excessive or frequent menstruation       Current Meds    Current Outpatient Medications:      doxycycline hyclate (VIBRA-TABS) 100 MG tablet, Take 1 tablet (100 mg) by mouth 2 times daily (Patient not taking: Reported on 5/5/2021), Disp: 20 tablet, Rfl: 0     ferrous sulfate (IRON) 325 (65 Fe) MG tablet, Take 1 tablet (325 mg) by mouth 2 times daily (Patient not taking: Reported on 6/15/2023), Disp: 60 tablet, Rfl: 2     fluconazole (DIFLUCAN) 150 MG tablet, Take 1 tablet (150 mg) by mouth every 3 days for 3 doses, Disp: 3 tablet, Rfl: 0     HAYDEE PO, , Disp: , Rfl:      ibuprofen (ADVIL/MOTRIN) 600 MG tablet, Take 1 tablet (600 mg) by mouth every 6 hours as needed for moderate pain (Patient not taking: Reported on 9/1/2022), Disp: 30 tablet, Rfl: 0     Naproxen Sodium (ALEVE PO), , Disp: , Rfl:      NONFORMULARY, Black strap molasses (Patient not taking: Reported on 9/1/2022), Disp: , Rfl:     Allergies  Allergies   Allergen Reactions     Cephalexin      Migraine     Latex      Prednisone      Made bones hurt       SUBJECTIVE    HPI:  Abiola Silva  is a 38 year old female who has symptoms of vaginal itching and discharge for 2 day(s).  she denies dysuria, urgency, frequency, back pain, nausea, vomiting, fever and chills, flank pain,and vaginal odor.    Patient is new to [facility].    ROS:      Review of Systems   Constitutional: Negative.  Negative for activity change, chills, fatigue and fever.   HENT: Negative for congestion, ear pain, rhinorrhea and sore throat.    Respiratory: Negative.  Negative for cough and shortness of breath.    Cardiovascular: Negative.  Negative for chest pain and palpitations.   Gastrointestinal: Negative.  Negative for abdominal pain, diarrhea, nausea and vomiting.   Endocrine: Negative.  Negative for polydipsia and polyuria.   Genitourinary: Positive for vaginal discharge. Negative for dysuria, flank pain, frequency, hematuria, pelvic pain, urgency, vaginal bleeding and vaginal pain.   Musculoskeletal: Negative.  Negative for myalgias, neck pain and neck stiffness.   Allergic/Immunologic: Negative for immunocompromised state.   Neurological: Negative.  Negative for dizziness, weakness, light-headedness and headaches.   Hematological: Negative for adenopathy.       Family History   Family History   Problem Relation Age of Onset     Breast Cancer Mother      Cancer Father         Social History  Social History     Socioeconomic History     Marital status: Single     Spouse name: Not on file     Number of children: Not on file     Years of education: Not on file     Highest education level: Not on file   Occupational History     Occupation: student   Tobacco Use     Smoking status: Never     Smokeless tobacco: Never   Vaping Use     Vaping status: Not on file   Substance and Sexual Activity     Alcohol use: Yes     Comment: socially     Drug use: No     Sexual activity: Yes     Partners: Male     Birth control/protection: None, Pull-out method   Other Topics Concern     Parent/sibling w/ CABG, MI or angioplasty before 65F  55M? Not Asked   Social History Narrative    How much exercise per week? 3 times weekly    How much calcium per day? Very Little      How much caffeine per day? Soda  2 daily    How much vitamin D per day? Very Little    Do you/your family wear seatbelts?  Yes    Do you/your family use safety helmets? Yes    Do you/your family use sunscreen? No    Do you/your family keep firearms in the home? No    Do you/your family have a smoke detector(s)? Yes        Do you feel safe in your home? Yes    Has anyone ever touched you in an unwanted manner? No                  Social Determinants of Health     Financial Resource Strain: Not on file   Food Insecurity: Not on file   Transportation Needs: Not on file   Physical Activity: Not on file   Stress: Not on file   Social Connections: Not on file   Intimate Partner Violence: Not on file   Housing Stability: Not on file           OBJECTIVE     Vital signs noted and reviewed by Forrest Huynh PA-C  /83   Pulse 78   Temp 97.4  F (36.3  C) (Tympanic)   Resp 17   Wt 58.1 kg (128 lb)   SpO2 97%   BMI 25.00 kg/m       Physical Exam  Vitals and nursing note reviewed.   Constitutional:       General: She is not in acute distress.     Appearance: Normal appearance. She is well-developed. She is not ill-appearing, toxic-appearing or diaphoretic.   HENT:      Head: Normocephalic and atraumatic.      Right Ear: Tympanic membrane and external ear normal.      Left Ear: Tympanic membrane and external ear normal.   Eyes:      Pupils: Pupils are equal, round, and reactive to light.   Cardiovascular:      Rate and Rhythm: Normal rate and regular rhythm.      Heart sounds: Normal heart sounds. No murmur heard.     No friction rub. No gallop.   Pulmonary:      Effort: Pulmonary effort is normal. No respiratory distress.      Breath sounds: Normal breath sounds. No wheezing or rales.   Chest:      Chest wall: No tenderness.   Abdominal:      General: Bowel sounds are normal. There is  no distension.      Palpations: Abdomen is soft. Abdomen is not rigid. There is no mass.      Tenderness: There is no abdominal tenderness. There is no guarding or rebound. Negative signs include Mauricio's sign and McBurney's sign.   Musculoskeletal:      Cervical back: Normal range of motion and neck supple.   Lymphadenopathy:      Cervical: No cervical adenopathy.   Skin:     General: Skin is warm and dry.   Neurological:      Mental Status: She is alert and oriented to person, place, and time.      Cranial Nerves: No cranial nerve deficit.      Deep Tendon Reflexes: Reflexes are normal and symmetric.   Psychiatric:         Behavior: Behavior normal. Behavior is cooperative.         Thought Content: Thought content normal.         Judgment: Judgment normal.             Forrest Huynh PA-C  6/15/2023, 4:01 PM

## 2023-11-11 ENCOUNTER — HEALTH MAINTENANCE LETTER (OUTPATIENT)
Age: 38
End: 2023-11-11

## 2024-11-26 NOTE — PROGRESS NOTES
SUBJECTIVE:                                                    Abiola Silva is a 31 year old female who presents to clinic today for the following health issues:  Health Maintenance Due   Topic Date Due     INFLUENZA VACCINE (SYSTEM ASSIGNED)  2016     Health Maintenance reviewed at today's visit patient asked to schedule/complete:   Immunizations:  Patient declined    Vaginal Symptoms      Duration: 1 WEEK    Description  vaginal discharge - creamy, itching and DRYNESS    Intensity:  moderate    Accompanying signs and symptoms (fever/dysuria/abdominal or back pain): None    History  Sexually active: yes, single partner, contraception - none  Possibility of pregnancy: possible, but doesn't think she is  Recent antibiotic use: no     Precipitating or alleviating factors: None    Therapies tried and outcome: none   Outcome: na     .    .  Current Outpatient Prescriptions   Medication Sig Dispense Refill     metroNIDAZOLE (FLAGYL) 500 MG tablet Take 1 tablet (500 mg) by mouth 2 times daily 28 tablet 0          Allergies   Allergen Reactions     Cephalosporins      Migraine     Latex      Prednisone      Made bones hurt       Immunization History   Administered Date(s) Administered     Tdap (Adacel,Boostrix) 2012         reports that she drinks alcohol.      reports that she does not use illicit drugs.    family history includes Breast Cancer in her mother; CANCER in her father.    indicated that her mother is alive. She indicated that her father is alive. She indicated that her maternal grandmother is . She indicated that her maternal grandfather is . She indicated that her paternal grandmother is . She indicated that her paternal grandfather is .      has past surgical history that includes  section ().     reports that she currently engages in sexual activity and has had male partners. She reports using the following method of birth control/protection:  None.  .  Pediatric History   Patient Guardian Status     Mother:  Milka Silva     Other Topics Concern     Not on file     Social History Narrative    How much exercise per week? 3 times weekly    How much calcium per day? Very Little      How much caffeine per day? Soda  2 daily    How much vitamin D per day? Very Little    Do you/your family wear seatbelts?  Yes    Do you/your family use safety helmets? Yes    Do you/your family use sunscreen? No    Do you/your family keep firearms in the home? No    Do you/your family have a smoke detector(s)? Yes        Do you feel safe in your home? Yes    Has anyone ever touched you in an unwanted manner? No                          reports that she has never smoked. She has never used smokeless tobacco.    Medical, social, surgical, and family histories reviewed.    /72 mmHg  Pulse 86  Temp(Src) 98.7  F (37.1  C) (Tympanic)  Resp 16  Ht 5' (1.524 m)  Wt 153 lb 3.2 oz (69.491 kg)  BMI 29.92 kg/m2  SpO2 100%    Body mass index is 29.92 kg/(m^2).    Problem list, Medication list, Allergies, and Medical/Social/Surgical histories reviewed in [facility] and updated as appropriate.  Labs reviewed in EP[facility]  Patient Active Problem List   Diagnosis     Left breast lump     Ovarian cyst     Excessive or frequent menstruation     Past Surgical History   Procedure Laterality Date      section         Social History   Substance Use Topics     Smoking status: Never Smoker      Smokeless tobacco: Never Used     Alcohol Use: Yes      Comment: socially     Family History   Problem Relation Age of Onset     Breast Cancer Mother      CANCER Father          Allergies   Allergen Reactions     Cephalosporins      Migraine     Latex      Prednisone      Made bones hurt     Recent Labs   Lab Test  10/27/16   1332  11/04/15   2210  13   1415   ALT  20  18   --    CR  0.84  0.80   --    GFRESTIMATED  79  84   --    GFRESTBLACK  >90   GFR Calc     >90   GFR Calc     --    POTASSIUM  3.4  3.3*   --    TSH   --    --   1.67        BP Readings from Last 6 Encounters:   01/17/17 126/72   10/27/16 136/87   10/04/16 123/78   06/22/16 120/82   03/29/16 133/79   01/28/16 138/90       Wt Readings from Last 3 Encounters:   01/17/17 153 lb 3.2 oz (69.491 kg)   10/27/16 146 lb 3.2 oz (66.316 kg)   10/04/16 149 lb 14.4 oz (67.994 kg)         Positive symptoms or findings indicated by bold designation:     ROS: 10 point ROS neg other than the symptoms noted above in the HPI.except  has Left breast lump; Ovarian cyst; and Excessive or frequent menstruation on her problem list.   Constitutional: The patient denied fatigue, fever, insomnia, night sweats, recent illness and weight loss.  WITHIN NORMAL LIMITS     Eyes: The patient denied blindness, eye pain, eye tearing, photophobia, vision change and visual disturbance.NL     Ears/Nose/Throat/Neck: The patient denied dizziness, facial pain, hearing loss, nasal discharge, oral pain, otalgia, postnasal drip, sinus congestion, sore throat, tinnitus and voice change.  NORMAL     Cardiovascular: The patient denied arrhythmia, chest pain/pressure, claudication, edema, exercise intolerance, fatigue, orthopnea, palpitations and syncope.  NORMAL     Respiratory: The patient denied asthma, chest congestion, cough, dyspnea on exertion, dyspnea/shortness of breath, hemoptysis, pedal edema, pleuritic pain, productive sputum, snoring and wheezing. NORMAL     Gastrointestinal: The patient denied abdominal pain, anorexia, constipation, diarrhea, dysphagia, gastroesophageal reflux, hematochezia, hemorrhoids, melena, nausea and vomiting . NORMAL     Genitourinary/Nephrology: The patient denied breast complaint, dysuria, nocturia sexual dysfunction, t, urinary frequency, urinary incontinence, urinary urgency        Musculoskeletal: The patient denied arthralgia(s), back pain, joint complaint, muscle weakness, myalgias,  osteoporosis, sciatica, stiffness and swelling.      Dermatoligic:: The patient denied acne, dermatitis, ecchymosis, itching, mole change, rash, skin cancer, skin lesion and sores.      Neurologic: The patient denied dizziness, gait abnormality, headache, memory loss, mental status change, paresis, paresthesia, seizure, syncope, tremor and vision change.     Psychiatric: The patient denied anxiety, depression, disturbances of memory, drug abuse, insomnia, mood swings and relationship difficulties.      Endocrine: The patient denied , goiter, obesity, polyuria and thyroid disease.      Hematologic/Lymphatic: The patient denied abnormal bleeding and bruising, abnormal ecchymoses, anemia, lymph node enlargement/mass, petechiae and venous  Thrombosis.      Allergy/Immunology: The patient denied food allergy and  Allergic rhinitis or conjunctivitis.  NORMAL       PE:  /72 mmHg  Pulse 86  Temp(Src) 98.7  F (37.1  C) (Tympanic)  Resp 16  Ht 5' (1.524 m)  Wt 153 lb 3.2 oz (69.491 kg)  BMI 29.92 kg/m2  SpO2 100% Body mass index is 29.92 kg/(m^2).    Constitutional: general appearance, well nourished, well developed, in no acute distress, well developed, appears stated age, normal body habitus,  NORMAL     Eyes:; The patient has normal eyelids sclerae and conjunctivae : NORMAL      Ears/Nose/Throat: external ear, overall: normal appearance; external nose, overall: benign appearance, normal moujth gums and lips  The patient has:  NORMAL     Neck: thyroid, overall: normal size, normal consistency, nontender,  NORMAL       Urogenital; no renal, flank or bladder  tenderness;  WANTED NO PELVIC        Psychiatric: orientation/consciousness, overall: oriented to person, place and time; behavior/psychomotor activity, no tics, normal psychomotor activity; mood and affect, overall: normal mood and affect; appearance, overall: well-groomed, good eye contact; speech, overall: normal quality, no aphasia and normal quality,  quantity, intact.        ICD-10-CM    1. Yeast infection of the vagina B37.3 Wet prep     Chlamydia trachomatis PCR     Neisseria gonorrhoeae PCR   2. Bacterial vaginal infection N76.0 Chlamydia trachomatis PCR    B96.89 Neisseria gonorrhoeae PCR   3. Vaginitis and vulvovaginitis N76.0 metroNIDAZOLE (FLAGYL) 500 MG tablet     Chlamydia trachomatis PCR     Neisseria gonorrhoeae PCR     DISCONTINUED: metroNIDAZOLE (FLAGYL) 500 MG tablet        .    Side effects benefits and risks thoroughly discussed. .she may come in early if unimproved or getting worse          Importance of adhering to regimen discussed and if medications were dispensed, the importance of taking medications discussed and bringing in the medications after every visit for chronic problems         Please drink 2 glasses of water prior to meals and walk 15-30 minutes after meals    I spent  15 MINUTES   with patient discussing the following issues    The primary encounter diagnosis was Yeast infection of the vagina. Diagnoses of Bacterial vaginal infection and Vaginitis and vulvovaginitis were also pertinent to this visit. over half of which involved counseling and coordination of care.    Patient Instructions   (B37.3) Yeast infection of the vagina  (primary encounter diagnosis)  Comment:    Plan: Wet prep, Chlamydia trachomatis PCR, Neisseria         gonorrhoeae PCR             (N76.0,  B96.89) Bacterial vaginal infection  Comment:    Plan: Chlamydia trachomatis PCR, Neisseria         gonorrhoeae PCR             (N76.0) Vaginitis and vulvovaginitis  Comment:    Plan: metroNIDAZOLE (FLAGYL) 500 MG tablet, Chlamydia        trachomatis PCR, Neisseria gonorrhoeae PCR,         DISCONTINUED: metroNIDAZOLE (FLAGYL) 500 MG         tablet                       Diet:  NOT APPLICABLE     Exercise:  NOT APPLICABLE   Exercises Range of motion, balance, isometric, and strengthening exercises 30 repetitions twice daily of involved joints      .MELANIE JIMENEZ MD  1/17/2017 7:57 PM  January 17, 2017               Elvis Valencia

## 2024-12-28 ENCOUNTER — HEALTH MAINTENANCE LETTER (OUTPATIENT)
Age: 39
End: 2024-12-28

## 2025-02-23 ENCOUNTER — HEALTH MAINTENANCE LETTER (OUTPATIENT)
Age: 40
End: 2025-02-23